# Patient Record
Sex: FEMALE | Race: WHITE | HISPANIC OR LATINO
[De-identification: names, ages, dates, MRNs, and addresses within clinical notes are randomized per-mention and may not be internally consistent; named-entity substitution may affect disease eponyms.]

---

## 2023-03-19 PROBLEM — Z00.00 ENCOUNTER FOR PREVENTIVE HEALTH EXAMINATION: Status: ACTIVE | Noted: 2023-03-19

## 2023-03-20 ENCOUNTER — APPOINTMENT (OUTPATIENT)
Dept: OBGYN | Facility: CLINIC | Age: 38
End: 2023-03-20
Payer: COMMERCIAL

## 2023-03-20 VITALS
BODY MASS INDEX: 30.12 KG/M2 | WEIGHT: 170 LBS | DIASTOLIC BLOOD PRESSURE: 86 MMHG | HEART RATE: 83 BPM | SYSTOLIC BLOOD PRESSURE: 121 MMHG | OXYGEN SATURATION: 97 % | HEIGHT: 63 IN

## 2023-03-20 DIAGNOSIS — Z12.4 ENCOUNTER FOR SCREENING FOR MALIGNANT NEOPLASM OF CERVIX: ICD-10-CM

## 2023-03-20 DIAGNOSIS — Z01.419 ENCOUNTER FOR GYNECOLOGICAL EXAMINATION (GENERAL) (ROUTINE) W/OUT ABNORMAL FINDINGS: ICD-10-CM

## 2023-03-20 PROCEDURE — 36415 COLL VENOUS BLD VENIPUNCTURE: CPT

## 2023-03-20 PROCEDURE — 99385 PREV VISIT NEW AGE 18-39: CPT

## 2023-03-20 NOTE — COUNSELING
[Vitamins/Supplements] : vitamins/supplements [Preconception Care/ Fertility options] : preconception care, fertility options [STD (testing, results, tx)] : STD (testing, results, tx) [Lab Results] : lab results

## 2023-03-21 ENCOUNTER — NON-APPOINTMENT (OUTPATIENT)
Age: 38
End: 2023-03-21

## 2023-03-21 LAB
HIV1+2 AB SPEC QL IA.RAPID: NONREACTIVE
T PALLIDUM AB SER QL IA: NEGATIVE

## 2023-03-21 NOTE — PHYSICAL EXAM
[Chaperone Present] : A chaperone was present in the examining room during all aspects of the physical examination [Appropriately responsive] : appropriately responsive [Alert] : alert [No Acute Distress] : no acute distress [Soft] : soft [Non-tender] : non-tender [No Lesions] : no lesions [No Mass] : no mass [Oriented x3] : oriented x3 [Examination Of The Breasts] : a normal appearance [No Masses] : no breast masses were palpable [Labia Majora] : normal [Labia Minora] : normal [Discharge] : a  ~M vaginal discharge was present [Scant] : scant [White] : white [Thick] : thick [No Bleeding] : There was no active vaginal bleeding [Normal] : normal [Uterine Adnexae] : non-palpable [FreeTextEntry3] : nontender thyroid [FreeTextEntry7] : well healed Pfannenstiel incision [FreeTextEntry8] : Nontender, no CMT, no adnexal masses or fullness appreciated.

## 2023-03-21 NOTE — HISTORY OF PRESENT ILLNESS
[Frequency: Q ___ days] : menstrual periods occur approximately every [unfilled] days [Menstrual Cramps] : menstrual cramps [HCG+: ___(Date)] : a positive HCG was recorded on [unfilled] [Currently Active] : currently active [Men] : men [No] : No [Patient refuses STI testing] : Patient refuses STI testing [FreeTextEntry1] : 03/11/2023

## 2023-03-21 NOTE — PLAN
[FreeTextEntry1] : Ms. Morales presents for well woman's  exam. Patient is clinically doing well with no acute gyn complaints. \par - Vitals reviewed and within normal limits. \par - Breast exam, pelvic exam and pap smear performed today. \par - Full STI testing performed as per patient. \par - Patient counseled to start taking a PNV as she desires pregnancy and is not using contraception. \par - Patient preventative health actions reviewed-  encouraged well balanced diet and weight bearing exercise. \par  \par Return to the office pending results, as needed for GYN concerns and in 1 year for annual follow up. Plan of care discussed with patient who has no additional questions and is in agreement.\par

## 2023-03-22 ENCOUNTER — NON-APPOINTMENT (OUTPATIENT)
Age: 38
End: 2023-03-22

## 2023-03-23 LAB
C TRACH RRNA SPEC QL NAA+PROBE: NOT DETECTED
CANDIDA VAG CYTO: NOT DETECTED
G VAGINALIS+PREV SP MTYP VAG QL MICRO: DETECTED
HBV SURFACE AB SERPL IA-ACNC: 20 MIU/ML
HBV SURFACE AG SER QL: NONREACTIVE
HCV AB SER QL: NONREACTIVE
HCV S/CO RATIO: 0.12 S/CO
HPV HIGH+LOW RISK DNA PNL CVX: NOT DETECTED
N GONORRHOEA RRNA SPEC QL NAA+PROBE: NOT DETECTED
SOURCE AMPLIFICATION: NORMAL
T VAGINALIS VAG QL WET PREP: NOT DETECTED

## 2023-03-28 LAB — CYTOLOGY CVX/VAG DOC THIN PREP: ABNORMAL

## 2023-04-18 ENCOUNTER — TRANSCRIPTION ENCOUNTER (OUTPATIENT)
Age: 38
End: 2023-04-18

## 2023-05-02 ENCOUNTER — TRANSCRIPTION ENCOUNTER (OUTPATIENT)
Age: 38
End: 2023-05-02

## 2023-07-28 ENCOUNTER — TRANSCRIPTION ENCOUNTER (OUTPATIENT)
Age: 38
End: 2023-07-28

## 2023-07-28 RX ORDER — METRONIDAZOLE 500 MG/1
500 TABLET ORAL TWICE DAILY
Qty: 14 | Refills: 0 | Status: ACTIVE | COMMUNITY
Start: 2023-03-22 | End: 1900-01-01

## 2023-10-04 ENCOUNTER — APPOINTMENT (OUTPATIENT)
Dept: UROGYNECOLOGY | Facility: CLINIC | Age: 38
End: 2023-10-04
Payer: COMMERCIAL

## 2023-10-04 ENCOUNTER — TRANSCRIPTION ENCOUNTER (OUTPATIENT)
Age: 38
End: 2023-10-04

## 2023-10-04 ENCOUNTER — NON-APPOINTMENT (OUTPATIENT)
Age: 38
End: 2023-10-04

## 2023-10-04 VITALS
OXYGEN SATURATION: 99 % | DIASTOLIC BLOOD PRESSURE: 72 MMHG | HEART RATE: 81 BPM | WEIGHT: 167 LBS | SYSTOLIC BLOOD PRESSURE: 120 MMHG | BODY MASS INDEX: 29.58 KG/M2

## 2023-10-04 DIAGNOSIS — N76.0 ACUTE VAGINITIS: ICD-10-CM

## 2023-10-04 DIAGNOSIS — B96.89 ACUTE VAGINITIS: ICD-10-CM

## 2023-10-04 PROCEDURE — 99213 OFFICE O/P EST LOW 20 MIN: CPT

## 2023-10-04 PROCEDURE — 36415 COLL VENOUS BLD VENIPUNCTURE: CPT

## 2023-10-04 RX ORDER — METRONIDAZOLE 7.5 MG/G
0.75 GEL VAGINAL
Qty: 1 | Refills: 1 | Status: ACTIVE | COMMUNITY
Start: 2023-10-04 | End: 1900-01-01

## 2023-10-05 ENCOUNTER — NON-APPOINTMENT (OUTPATIENT)
Age: 38
End: 2023-10-05

## 2023-10-05 ENCOUNTER — TRANSCRIPTION ENCOUNTER (OUTPATIENT)
Age: 38
End: 2023-10-05

## 2023-10-05 LAB
C TRACH RRNA SPEC QL NAA+PROBE: NOT DETECTED
CANDIDA VAG CYTO: NOT DETECTED
G VAGINALIS+PREV SP MTYP VAG QL MICRO: DETECTED
HBV SURFACE AG SER QL: NONREACTIVE
HCV AB SER QL: NONREACTIVE
HCV S/CO RATIO: 0.09 S/CO
HIV1+2 AB SPEC QL IA.RAPID: NONREACTIVE
N GONORRHOEA RRNA SPEC QL NAA+PROBE: NOT DETECTED
SOURCE AMPLIFICATION: NORMAL
T PALLIDUM AB SER QL IA: NEGATIVE
T VAGINALIS VAG QL WET PREP: NOT DETECTED

## 2024-06-10 ENCOUNTER — APPOINTMENT (OUTPATIENT)
Dept: OBGYN | Facility: CLINIC | Age: 39
End: 2024-06-10
Payer: COMMERCIAL

## 2024-06-10 VITALS
WEIGHT: 171 LBS | OXYGEN SATURATION: 99 % | DIASTOLIC BLOOD PRESSURE: 78 MMHG | HEART RATE: 76 BPM | BODY MASS INDEX: 30.29 KG/M2 | SYSTOLIC BLOOD PRESSURE: 128 MMHG

## 2024-06-10 DIAGNOSIS — Z11.3 ENCOUNTER FOR SCREENING FOR INFECTIONS WITH A PREDOMINANTLY SEXUAL MODE OF TRANSMISSION: ICD-10-CM

## 2024-06-10 PROCEDURE — 36415 COLL VENOUS BLD VENIPUNCTURE: CPT

## 2024-06-10 PROCEDURE — 99395 PREV VISIT EST AGE 18-39: CPT

## 2024-06-10 PROCEDURE — 99459 PELVIC EXAMINATION: CPT

## 2024-06-10 NOTE — PLAN
[FreeTextEntry1] : Ms. Morales presents for well woman's  exam. Patient is clinically doing well with no acute gyn complaints.  - Vitals reviewed and within normal limits.  - Breast exam, pelvic exam and pap smear performed today.  - STI testing performed as per patient.  - Patient preventative health actions reviewed-  encouraged well balanced diet and weight bearing exercise.    Return to the office pending results, as needed for GYN concerns and in 1 year for annual follow up. Plan of care discussed with patient who has no additional questions and is in agreement.

## 2024-06-10 NOTE — PROCEDURE
[Cervical Pap Smear] : cervical Pap smear [Liquid Base] : liquid base [Tolerated Well] : the patient tolerated the procedure well [No Complications] : there were no complications [de-identified] : Vaginitis plus

## 2024-06-10 NOTE — HISTORY OF PRESENT ILLNESS
[Patient reported PAP Smear was abnormal] : Patient reported PAP Smear was abnormal [N] : Patient does not use contraception [Y] : Patient is sexually active [Normal Amount/Duration] :  normal amount and duration [Frequency: Q ___ days] : menstrual periods occur approximately every [unfilled] days [Menstrual Cramps] : menstrual cramps [Currently Active] : currently active [Men] : men [No] : No [Yes] : Yes [Condoms] : Condoms [Patient would like to be screened for STIs] : Patient would like to be screened for STIs [PapSmeardate] : 3/20/23 [LMPDate] : 05/10/2024 [MensesFreq] : 28 [MensesLength] : 5-7  [FreeTextEntry1] : 5/10/2024

## 2024-06-10 NOTE — PHYSICAL EXAM
[Chaperone Present] : A chaperone was present in the examining room during all aspects of the physical examination [00636] : A chaperone was present during the pelvic exam. [Appropriately responsive] : appropriately responsive [Alert] : alert [No Acute Distress] : no acute distress [Soft] : soft [Non-tender] : non-tender [Non-distended] : non-distended [No Lesions] : no lesions [No Mass] : no mass [Examination Of The Breasts] : a normal appearance [No Masses] : no breast masses were palpable [Labia Majora] : normal [Labia Minora] : normal [Normal] : normal [Uterine Adnexae] : non-palpable [FreeTextEntry3] : nontender thyroid [FreeTextEntry8] : Nontender, no CMT, no adnexal masses or fullness appreciated.

## 2024-06-17 ENCOUNTER — TRANSCRIPTION ENCOUNTER (OUTPATIENT)
Age: 39
End: 2024-06-17

## 2024-06-17 LAB
A VAGINAE DNA VAG QL NAA+PROBE: NORMAL
BVAB2 DNA VAG QL NAA+PROBE: ABNORMAL
C KRUSEI DNA VAG QL NAA+PROBE: NEGATIVE
C TRACH RRNA SPEC QL NAA+PROBE: NEGATIVE
CANDIDA DNA VAG QL NAA+PROBE: NEGATIVE
CYTOLOGY CVX/VAG DOC THIN PREP: ABNORMAL
HBV SURFACE AG SER QL: NONREACTIVE
HCV AB SER QL: NONREACTIVE
HCV S/CO RATIO: 0.16 S/CO
HIV1+2 AB SPEC QL IA.RAPID: NONREACTIVE
HPV HIGH+LOW RISK DNA PNL CVX: NOT DETECTED
HSV 1+2 IGG SER IA-IMP: NEGATIVE
HSV 1+2 IGG SER IA-IMP: POSITIVE
HSV1 IGG SER QL: 17.2 INDEX
HSV2 IGG SER QL: 0.1 INDEX
MEGA1 DNA VAG QL NAA+PROBE: ABNORMAL
N GONORRHOEA RRNA SPEC QL NAA+PROBE: NEGATIVE
T PALLIDUM AB SER QL IA: NEGATIVE
T VAGINALIS RRNA SPEC QL NAA+PROBE: NEGATIVE

## 2024-07-30 ENCOUNTER — TRANSCRIPTION ENCOUNTER (OUTPATIENT)
Age: 39
End: 2024-07-30

## 2024-07-30 RX ORDER — METRONIDAZOLE 500 MG/1
500 TABLET ORAL
Qty: 14 | Refills: 0 | Status: ACTIVE | COMMUNITY
Start: 2024-07-30 | End: 1900-01-01

## 2024-08-08 ENCOUNTER — TRANSCRIPTION ENCOUNTER (OUTPATIENT)
Age: 39
End: 2024-08-08

## 2024-08-27 ENCOUNTER — APPOINTMENT (OUTPATIENT)
Dept: PULMONOLOGY | Facility: CLINIC | Age: 39
End: 2024-08-27

## 2024-08-27 ENCOUNTER — APPOINTMENT (OUTPATIENT)
Dept: PULMONOLOGY | Facility: CLINIC | Age: 39
End: 2024-08-27
Payer: COMMERCIAL

## 2024-08-27 VITALS
BODY MASS INDEX: 30.22 KG/M2 | DIASTOLIC BLOOD PRESSURE: 99 MMHG | HEIGHT: 63 IN | OXYGEN SATURATION: 98 % | TEMPERATURE: 97.5 F | WEIGHT: 170.56 LBS | SYSTOLIC BLOOD PRESSURE: 136 MMHG | HEART RATE: 83 BPM

## 2024-08-27 DIAGNOSIS — R06.02 SHORTNESS OF BREATH: ICD-10-CM

## 2024-08-27 DIAGNOSIS — Z82.5 FAMILY HISTORY OF ASTHMA AND OTHER CHRONIC LOWER RESPIRATORY DISEASES: ICD-10-CM

## 2024-08-27 DIAGNOSIS — J45.901 UNSPECIFIED ASTHMA WITH (ACUTE) EXACERBATION: ICD-10-CM

## 2024-08-27 PROCEDURE — ZZZZZ: CPT

## 2024-08-27 PROCEDURE — 94726 PLETHYSMOGRAPHY LUNG VOLUMES: CPT

## 2024-08-27 PROCEDURE — 94729 DIFFUSING CAPACITY: CPT

## 2024-08-27 PROCEDURE — 99204 OFFICE O/P NEW MOD 45 MIN: CPT | Mod: 25

## 2024-08-27 PROCEDURE — 94060 EVALUATION OF WHEEZING: CPT

## 2024-08-27 RX ORDER — PREDNISONE 20 MG/1
20 TABLET ORAL
Qty: 4 | Refills: 0 | Status: ACTIVE | COMMUNITY
Start: 2024-08-27 | End: 1900-01-01

## 2024-08-27 RX ORDER — IPRATROPIUM BROMIDE AND ALBUTEROL SULFATE 2.5; .5 MG/3ML; MG/3ML
0.5-2.5 (3) SOLUTION RESPIRATORY (INHALATION)
Qty: 1 | Refills: 6 | Status: ACTIVE | COMMUNITY
Start: 2024-08-27 | End: 1900-01-01

## 2024-08-27 RX ORDER — ALBUTEROL SULFATE 90 UG/1
108 (90 BASE) POWDER, METERED RESPIRATORY (INHALATION)
Refills: 0 | Status: ACTIVE | COMMUNITY

## 2024-08-27 RX ORDER — FLUTICASONE FUROATE AND VILANTEROL TRIFENATATE 200; 25 UG/1; UG/1
200-25 POWDER RESPIRATORY (INHALATION) DAILY
Qty: 1 | Refills: 3 | Status: ACTIVE | COMMUNITY
Start: 2024-08-27 | End: 1900-01-01

## 2024-08-27 RX ORDER — BUDESONIDE AND FORMOTEROL FUMARATE DIHYDRATE 160; 4.5 UG/1; UG/1
160-4.5 AEROSOL RESPIRATORY (INHALATION) TWICE DAILY
Qty: 1 | Refills: 6 | Status: DISCONTINUED | COMMUNITY
Start: 2024-08-27 | End: 2024-08-27

## 2024-08-27 RX ORDER — PREDNISONE 5 MG/1
5 TABLET ORAL
Refills: 0 | Status: ACTIVE | COMMUNITY

## 2024-08-27 NOTE — HISTORY OF PRESENT ILLNESS
[Never] : never [TextBox_4] : 39-year-old never smoker diagnosed with asthma as a child presents with worsening asthma symptoms over the last 2 weeks. She states that about 2 weeks ago she began to have chest tightness, cough, wheezing, phlegm and worsening nighttime symptoms. She went to urgent care last Thursday 8/29 and was started on steroids. She has been using albuterol inhaler at least daily to 3 times per day with some nighttime symptoms. She has needed to use her brother's nebulizer as she has a family hx of asthma. She has dogs and cats for which she has had for many years. She does notice that a dust rug in her office seems to trigger her and the cold weather as well. She has no other workplace exposures. She has struggled with stairs and exertional shortness of breath during this time. [ESS] : 0

## 2024-08-27 NOTE — ASSESSMENT
[FreeTextEntry1] : 39 year old never smoker with hx of asthma as a kid presents with severe worsening of her asthma symptoms over the last two weeks.  Data reviewed: PFT 8/27/2024: FVC (113%), FEV1 (101%), FEV1/FVC (75), TLC (103%), FEF 25%-75% (58%) RV (78%), DLCO (92%). Borderline obstruction on spirometry with significant reduction in small airways and significant reversibility in small airways. Normal lung volumes and normal DLCO.   Severe asthma with exacerbation Shortness of breath  Patient clinically does appear to be in asthma exacerbation as she is using inhaler multiple times, reported wheezing, chest tightness. She is to finish steroids tomorrow and given that she is still coughing and short of breath will increase steroids by 2 days. Will also start her on standing breo BID. Will plan to obtain secondary work up at 2 week follow up given that steroids will reduce her inflammatory markers.  - Breo 200mg with albuterol for break through - Steroids increased to total of 7 days - Viral swab - PFT today with significant small airway involvement and reversibility    RTC in 2 weeks

## 2024-09-08 ENCOUNTER — NON-APPOINTMENT (OUTPATIENT)
Age: 39
End: 2024-09-08

## 2024-09-09 ENCOUNTER — APPOINTMENT (OUTPATIENT)
Dept: PULMONOLOGY | Facility: CLINIC | Age: 39
End: 2024-09-09
Payer: COMMERCIAL

## 2024-09-09 VITALS
OXYGEN SATURATION: 96 % | BODY MASS INDEX: 31.65 KG/M2 | SYSTOLIC BLOOD PRESSURE: 123 MMHG | RESPIRATION RATE: 12 BRPM | TEMPERATURE: 97.6 F | HEART RATE: 75 BPM | DIASTOLIC BLOOD PRESSURE: 76 MMHG | WEIGHT: 172 LBS | HEIGHT: 62 IN

## 2024-09-09 DIAGNOSIS — R06.02 SHORTNESS OF BREATH: ICD-10-CM

## 2024-09-09 DIAGNOSIS — J45.901 UNSPECIFIED ASTHMA WITH (ACUTE) EXACERBATION: ICD-10-CM

## 2024-09-09 PROCEDURE — G2211 COMPLEX E/M VISIT ADD ON: CPT

## 2024-09-09 PROCEDURE — 99214 OFFICE O/P EST MOD 30 MIN: CPT

## 2024-09-09 RX ORDER — FLUTICASONE PROPIONATE AND SALMETEROL 250; 50 UG/1; UG/1
250-50 POWDER RESPIRATORY (INHALATION) TWICE DAILY
Qty: 1 | Refills: 10 | Status: ACTIVE | COMMUNITY
Start: 2024-09-09 | End: 1900-01-01

## 2024-09-09 NOTE — HISTORY OF PRESENT ILLNESS
[Never] : never [TextBox_4] : 39-year-old never smoker diagnosed with asthma as a child presents with worsening asthma symptoms over the last 2 weeks. She states that about 2 weeks ago she began to have chest tightness, cough, wheezing, phlegm and worsening nighttime symptoms. She went to urgent care last Thursday 8/29 and was started on steroids. She has been using albuterol inhaler at least daily to 3 times per day with some nighttime symptoms. She has needed to use her brother's nebulizer as she has a family hx of asthma. She has dogs and cats for which she has had for many years. She does notice that a dust rug in her office seems to trigger her and the cold weather as well. She has no other workplace exposures. She has struggled with stairs and exertional shortness of breath during this time.   9/9/2024 Patient feeling much better now only needing about 3 uses of albuterol over the last 2 weeks. She notices still at times that she is tight but much improved from previous. [ESS] : 0

## 2024-09-09 NOTE — ASSESSMENT
[FreeTextEntry1] : 39 year old never smoker with hx of asthma as a kid presents with severe worsening of her asthma symptoms over the last two weeks.  Data reviewed: PFT 8/27/2024: FVC (113%), FEV1 (101%), FEV1/FVC (75), TLC (103%), FEF 25%-75% (58%) RV (78%), DLCO (92%). Borderline obstruction on spirometry with significant reduction in small airways and significant reversibility in small airways. Normal lung volumes and normal DLCO.   Severe asthma with exacerbation Shortness of breath  Patient struggling to get maintenance inhaler due to insurance. She is much improved but had large amount of small airway disease on PFT.  Will try to get her on advair 250mg BID and would plan to continue LABA/ICS for the next 3 months and then potentially trial her off. She states that change in weather is when she is the worst.  - Advair 250mg BID for 3 months - Improved from previous    RTC in 3 months

## 2024-11-26 ENCOUNTER — TRANSCRIPTION ENCOUNTER (OUTPATIENT)
Age: 39
End: 2024-11-26

## 2024-12-09 ENCOUNTER — APPOINTMENT (OUTPATIENT)
Dept: PULMONOLOGY | Facility: CLINIC | Age: 39
End: 2024-12-09
Payer: COMMERCIAL

## 2024-12-09 ENCOUNTER — LABORATORY RESULT (OUTPATIENT)
Age: 39
End: 2024-12-09

## 2024-12-09 VITALS
HEIGHT: 62 IN | RESPIRATION RATE: 12 BRPM | OXYGEN SATURATION: 97 % | TEMPERATURE: 97 F | BODY MASS INDEX: 31.65 KG/M2 | SYSTOLIC BLOOD PRESSURE: 112 MMHG | HEART RATE: 89 BPM | WEIGHT: 172 LBS | DIASTOLIC BLOOD PRESSURE: 69 MMHG

## 2024-12-09 DIAGNOSIS — J45.901 UNSPECIFIED ASTHMA WITH (ACUTE) EXACERBATION: ICD-10-CM

## 2024-12-09 DIAGNOSIS — R06.02 SHORTNESS OF BREATH: ICD-10-CM

## 2024-12-09 PROCEDURE — 99214 OFFICE O/P EST MOD 30 MIN: CPT

## 2024-12-09 PROCEDURE — G2211 COMPLEX E/M VISIT ADD ON: CPT

## 2024-12-09 RX ORDER — FLUTICASONE PROPIONATE AND SALMETEROL 500; 50 UG/1; UG/1
500-50 POWDER RESPIRATORY (INHALATION) TWICE DAILY
Qty: 1 | Refills: 6 | Status: ACTIVE | COMMUNITY
Start: 2024-12-09 | End: 1900-01-01

## 2024-12-10 LAB
BASOPHILS # BLD AUTO: 0.05 K/UL
BASOPHILS NFR BLD AUTO: 0.6 %
EOSINOPHIL # BLD AUTO: 0.08 K/UL
EOSINOPHIL # BLD MANUAL: 90 /UL
EOSINOPHIL NFR BLD AUTO: 1 %
HCT VFR BLD CALC: 40.1 %
HGB BLD-MCNC: 13.3 G/DL
IMM GRANULOCYTES NFR BLD AUTO: 0.1 %
LYMPHOCYTES # BLD AUTO: 2.6 K/UL
LYMPHOCYTES NFR BLD AUTO: 32.2 %
MAN DIFF?: NORMAL
MCHC RBC-ENTMCNC: 31.1 PG
MCHC RBC-ENTMCNC: 33.2 G/DL
MCV RBC AUTO: 93.9 FL
MONOCYTES # BLD AUTO: 0.52 K/UL
MONOCYTES NFR BLD AUTO: 6.4 %
NEUTROPHILS # BLD AUTO: 4.81 K/UL
NEUTROPHILS NFR BLD AUTO: 59.7 %
PLATELET # BLD AUTO: 257 K/UL
RBC # BLD: 4.27 M/UL
RBC # FLD: 12.6 %
WBC # FLD AUTO: 8.07 K/UL

## 2024-12-11 LAB
A ALTERNATA IGE QN: <0.1 KUA/L
A FUMIGATUS IGE QN: <0.1 KUA/L
C ALBICANS IGE QN: <0.1 KUA/L
C HERBARUM IGE QN: <0.1 KUA/L
CAT DANDER IGE QN: <0.1 KUA/L
COMMON RAGWEED IGE QN: <0.1 KUA/L
D FARINAE IGE QN: <0.1 KUA/L
D PTERONYSS IGE QN: <0.1 KUA/L
DEPRECATED A ALTERNATA IGE RAST QL: 0
DEPRECATED A FUMIGATUS IGE RAST QL: 0
DEPRECATED C ALBICANS IGE RAST QL: 0
DEPRECATED C HERBARUM IGE RAST QL: 0
DEPRECATED CAT DANDER IGE RAST QL: 0
DEPRECATED COMMON RAGWEED IGE RAST QL: 0
DEPRECATED D FARINAE IGE RAST QL: 0
DEPRECATED D PTERONYSS IGE RAST QL: 0
DEPRECATED DOG DANDER IGE RAST QL: 0
DEPRECATED M RACEMOSUS IGE RAST QL: 0
DEPRECATED ROACH IGE RAST QL: 0
DEPRECATED TIMOTHY IGE RAST QL: 0
DEPRECATED WHITE OAK IGE RAST QL: 0
DOG DANDER IGE QN: <0.1 KUA/L
M RACEMOSUS IGE QN: <0.1 KUA/L
ROACH IGE QN: <0.1 KUA/L
TIMOTHY IGE QN: <0.1 KUA/L
TOTAL IGE SMQN RAST: <2 KU/L
WHITE OAK IGE QN: <0.1 KUA/L

## 2024-12-13 ENCOUNTER — TRANSCRIPTION ENCOUNTER (OUTPATIENT)
Age: 39
End: 2024-12-13

## 2024-12-13 LAB
A FLAVUS AB FLD QL: NEGATIVE
A FUMIGATUS AB FLD QL: NEGATIVE
A NIGER AB FLD QL: NEGATIVE

## 2024-12-23 RX ORDER — METRONIDAZOLE 500 MG/1
500 TABLET ORAL TWICE DAILY
Qty: 10 | Refills: 2 | Status: ACTIVE | COMMUNITY
Start: 2024-12-23 | End: 1900-01-01

## 2025-05-16 ENCOUNTER — NON-APPOINTMENT (OUTPATIENT)
Age: 40
End: 2025-05-16

## 2025-05-16 ENCOUNTER — APPOINTMENT (OUTPATIENT)
Dept: PULMONOLOGY | Facility: CLINIC | Age: 40
End: 2025-05-16
Payer: COMMERCIAL

## 2025-05-16 VITALS
OXYGEN SATURATION: 97 % | WEIGHT: 177 LBS | SYSTOLIC BLOOD PRESSURE: 121 MMHG | RESPIRATION RATE: 12 BRPM | TEMPERATURE: 98 F | DIASTOLIC BLOOD PRESSURE: 78 MMHG | HEIGHT: 62 IN | HEART RATE: 84 BPM | BODY MASS INDEX: 32.57 KG/M2

## 2025-05-16 DIAGNOSIS — R06.02 SHORTNESS OF BREATH: ICD-10-CM

## 2025-05-16 DIAGNOSIS — J45.901 UNSPECIFIED ASTHMA WITH (ACUTE) EXACERBATION: ICD-10-CM

## 2025-05-16 DIAGNOSIS — R09.82 POSTNASAL DRIP: ICD-10-CM

## 2025-05-16 PROCEDURE — 99214 OFFICE O/P EST MOD 30 MIN: CPT

## 2025-05-16 PROCEDURE — G2211 COMPLEX E/M VISIT ADD ON: CPT

## 2025-05-16 RX ORDER — FLUTICASONE PROPIONATE 50 UG/1
50 SPRAY, METERED NASAL TWICE DAILY
Qty: 1 | Refills: 3 | Status: ACTIVE | COMMUNITY
Start: 2025-05-16 | End: 1900-01-01

## 2025-05-16 RX ORDER — LEVOCETIRIZINE DIHYDROCHLORIDE 5 MG/1
5 TABLET ORAL DAILY
Qty: 30 | Refills: 3 | Status: ACTIVE | COMMUNITY
Start: 2025-05-16 | End: 1900-01-01

## 2025-05-16 RX ORDER — AZELASTINE 137 UG/1
137 SPRAY, METERED NASAL TWICE DAILY
Qty: 1 | Refills: 6 | Status: ACTIVE | COMMUNITY
Start: 2025-05-16 | End: 1900-01-01

## 2025-06-26 ENCOUNTER — NON-APPOINTMENT (OUTPATIENT)
Age: 40
End: 2025-06-26

## 2025-06-30 ENCOUNTER — APPOINTMENT (OUTPATIENT)
Dept: OBGYN | Facility: CLINIC | Age: 40
End: 2025-06-30

## 2025-09-03 ENCOUNTER — LABORATORY RESULT (OUTPATIENT)
Age: 40
End: 2025-09-03

## 2025-09-03 ENCOUNTER — APPOINTMENT (OUTPATIENT)
Dept: OBGYN | Facility: CLINIC | Age: 40
End: 2025-09-03
Payer: COMMERCIAL

## 2025-09-03 ENCOUNTER — NON-APPOINTMENT (OUTPATIENT)
Age: 40
End: 2025-09-03

## 2025-09-03 VITALS
HEART RATE: 80 BPM | DIASTOLIC BLOOD PRESSURE: 82 MMHG | OXYGEN SATURATION: 98 % | SYSTOLIC BLOOD PRESSURE: 118 MMHG | HEIGHT: 66 IN | WEIGHT: 171 LBS | BODY MASS INDEX: 27.48 KG/M2

## 2025-09-03 DIAGNOSIS — N94.6 DYSMENORRHEA, UNSPECIFIED: ICD-10-CM

## 2025-09-03 DIAGNOSIS — Z01.419 ENCOUNTER FOR GYNECOLOGICAL EXAMINATION (GENERAL) (ROUTINE) W/OUT ABNORMAL FINDINGS: ICD-10-CM

## 2025-09-03 DIAGNOSIS — Z11.3 ENCOUNTER FOR SCREENING FOR INFECTIONS WITH A PREDOMINANTLY SEXUAL MODE OF TRANSMISSION: ICD-10-CM

## 2025-09-03 PROCEDURE — 99459 PELVIC EXAMINATION: CPT

## 2025-09-03 PROCEDURE — 99395 PREV VISIT EST AGE 18-39: CPT

## 2025-09-03 PROCEDURE — 99213 OFFICE O/P EST LOW 20 MIN: CPT | Mod: 25

## 2025-09-04 LAB
C TRACH RRNA SPEC QL NAA+PROBE: NOT DETECTED
HBV SURFACE AG SER QL: NONREACTIVE
HCV AB SER QL: NONREACTIVE
HCV S/CO RATIO: 0.12 S/CO
HIV1+2 AB SPEC QL IA.RAPID: NONREACTIVE
HPV HIGH+LOW RISK DNA PNL CVX: DETECTED
N GONORRHOEA RRNA SPEC QL NAA+PROBE: NOT DETECTED
SOURCE TP AMPLIFICATION: NORMAL
T VAGINALIS RRNA SPEC QL NAA+PROBE: NOT DETECTED

## 2025-09-08 LAB — T PALLIDUM AB SER QL IA: POSITIVE

## 2025-09-09 ENCOUNTER — TRANSCRIPTION ENCOUNTER (OUTPATIENT)
Age: 40
End: 2025-09-09

## 2025-09-09 LAB — CYTOLOGY CVX/VAG DOC THIN PREP: ABNORMAL
